# Patient Record
Sex: FEMALE | Race: ASIAN | NOT HISPANIC OR LATINO | Employment: FULL TIME | URBAN - METROPOLITAN AREA
[De-identification: names, ages, dates, MRNs, and addresses within clinical notes are randomized per-mention and may not be internally consistent; named-entity substitution may affect disease eponyms.]

---

## 2017-11-02 ENCOUNTER — ALLSCRIPTS OFFICE VISIT (OUTPATIENT)
Dept: OTHER | Facility: OTHER | Age: 57
End: 2017-11-02

## 2017-11-02 ENCOUNTER — LAB REQUISITION (OUTPATIENT)
Dept: LAB | Facility: HOSPITAL | Age: 57
End: 2017-11-02
Payer: COMMERCIAL

## 2017-11-02 DIAGNOSIS — Z11.51 ENCOUNTER FOR SCREENING FOR HUMAN PAPILLOMAVIRUS (HPV): ICD-10-CM

## 2017-11-02 DIAGNOSIS — Z12.4 ENCOUNTER FOR SCREENING FOR MALIGNANT NEOPLASM OF CERVIX: ICD-10-CM

## 2017-11-02 DIAGNOSIS — Z12.31 ENCOUNTER FOR SCREENING MAMMOGRAM FOR MALIGNANT NEOPLASM OF BREAST: ICD-10-CM

## 2017-11-02 PROCEDURE — 87624 HPV HI-RISK TYP POOLED RSLT: CPT | Performed by: OBSTETRICS & GYNECOLOGY

## 2017-11-02 PROCEDURE — G0145 SCR C/V CYTO,THINLAYER,RESCR: HCPCS | Performed by: OBSTETRICS & GYNECOLOGY

## 2017-11-03 ENCOUNTER — TRANSCRIBE ORDERS (OUTPATIENT)
Dept: ADMINISTRATIVE | Facility: HOSPITAL | Age: 57
End: 2017-11-03

## 2017-11-03 ENCOUNTER — APPOINTMENT (OUTPATIENT)
Dept: LAB | Facility: HOSPITAL | Age: 57
End: 2017-11-03
Payer: COMMERCIAL

## 2017-11-03 DIAGNOSIS — I51.9 MYXEDEMA HEART DISEASE: ICD-10-CM

## 2017-11-03 DIAGNOSIS — D64.9 ANEMIA, UNSPECIFIED TYPE: ICD-10-CM

## 2017-11-03 DIAGNOSIS — I10 ESSENTIAL HYPERTENSION, MALIGNANT: ICD-10-CM

## 2017-11-03 DIAGNOSIS — I51.9 MYXEDEMA HEART DISEASE: Primary | ICD-10-CM

## 2017-11-03 DIAGNOSIS — E03.9 MYXEDEMA HEART DISEASE: ICD-10-CM

## 2017-11-03 DIAGNOSIS — Z13.1 SCREENING FOR DIABETES MELLITUS: ICD-10-CM

## 2017-11-03 DIAGNOSIS — Z13.29 SCREENING FOR THYROID DISORDER: ICD-10-CM

## 2017-11-03 DIAGNOSIS — E03.9 MYXEDEMA HEART DISEASE: Primary | ICD-10-CM

## 2017-11-03 LAB
25(OH)D3 SERPL-MCNC: 24.9 NG/ML (ref 30–100)
ALBUMIN SERPL BCP-MCNC: 4.2 G/DL (ref 3.5–5)
ALP SERPL-CCNC: 68 U/L (ref 46–116)
ALT SERPL W P-5'-P-CCNC: 21 U/L (ref 12–78)
ANION GAP SERPL CALCULATED.3IONS-SCNC: 5 MMOL/L (ref 4–13)
AST SERPL W P-5'-P-CCNC: 21 U/L (ref 5–45)
BACTERIA UR QL AUTO: ABNORMAL /HPF
BASOPHILS # BLD AUTO: 0.03 THOUSANDS/ΜL (ref 0–0.1)
BASOPHILS NFR BLD AUTO: 1 % (ref 0–1)
BILIRUB SERPL-MCNC: 0.97 MG/DL (ref 0.2–1)
BILIRUB UR QL STRIP: ABNORMAL
BUN SERPL-MCNC: 17 MG/DL (ref 5–25)
CALCIUM SERPL-MCNC: 9.3 MG/DL (ref 8.3–10.1)
CHLORIDE SERPL-SCNC: 103 MMOL/L (ref 100–108)
CHOLEST SERPL-MCNC: 200 MG/DL (ref 50–200)
CLARITY UR: CLEAR
CO2 SERPL-SCNC: 32 MMOL/L (ref 21–32)
COLOR UR: YELLOW
CREAT SERPL-MCNC: 0.7 MG/DL (ref 0.6–1.3)
EOSINOPHIL # BLD AUTO: 0.12 THOUSAND/ΜL (ref 0–0.61)
EOSINOPHIL NFR BLD AUTO: 3 % (ref 0–6)
ERYTHROCYTE [DISTWIDTH] IN BLOOD BY AUTOMATED COUNT: 12.6 % (ref 11.6–15.1)
GFR SERPL CREATININE-BSD FRML MDRD: 96 ML/MIN/1.73SQ M
GLUCOSE P FAST SERPL-MCNC: 92 MG/DL (ref 65–99)
GLUCOSE UR STRIP-MCNC: NEGATIVE MG/DL
HCT VFR BLD AUTO: 39 % (ref 34.8–46.1)
HDLC SERPL-MCNC: 50 MG/DL (ref 40–60)
HGB BLD-MCNC: 13.5 G/DL (ref 11.5–15.4)
HGB UR QL STRIP.AUTO: ABNORMAL
KETONES UR STRIP-MCNC: ABNORMAL MG/DL
LDLC SERPL CALC-MCNC: 133 MG/DL (ref 0–100)
LEUKOCYTE ESTERASE UR QL STRIP: ABNORMAL
LYMPHOCYTES # BLD AUTO: 1.28 THOUSANDS/ΜL (ref 0.6–4.47)
LYMPHOCYTES NFR BLD AUTO: 27 % (ref 14–44)
MCH RBC QN AUTO: 31.5 PG (ref 26.8–34.3)
MCHC RBC AUTO-ENTMCNC: 34.6 G/DL (ref 31.4–37.4)
MCV RBC AUTO: 91 FL (ref 82–98)
MONOCYTES # BLD AUTO: 0.37 THOUSAND/ΜL (ref 0.17–1.22)
MONOCYTES NFR BLD AUTO: 8 % (ref 4–12)
NEUTROPHILS # BLD AUTO: 2.92 THOUSANDS/ΜL (ref 1.85–7.62)
NEUTS SEG NFR BLD AUTO: 61 % (ref 43–75)
NITRITE UR QL STRIP: NEGATIVE
NON-SQ EPI CELLS URNS QL MICRO: ABNORMAL /HPF
NRBC BLD AUTO-RTO: 0 /100 WBCS
PH UR STRIP.AUTO: 6 [PH] (ref 4.5–8)
PLATELET # BLD AUTO: 204 THOUSANDS/UL (ref 149–390)
PMV BLD AUTO: 9.5 FL (ref 8.9–12.7)
POTASSIUM SERPL-SCNC: 4.1 MMOL/L (ref 3.5–5.3)
PROT SERPL-MCNC: 8 G/DL (ref 6.4–8.2)
PROT UR STRIP-MCNC: NEGATIVE MG/DL
RBC # BLD AUTO: 4.29 MILLION/UL (ref 3.81–5.12)
RBC #/AREA URNS AUTO: ABNORMAL /HPF
SODIUM SERPL-SCNC: 140 MMOL/L (ref 136–145)
SP GR UR STRIP.AUTO: >=1.03 (ref 1–1.03)
TRIGL SERPL-MCNC: 84 MG/DL
TSH SERPL DL<=0.05 MIU/L-ACNC: 2.55 UIU/ML (ref 0.36–3.74)
UROBILINOGEN UR QL STRIP.AUTO: 0.2 E.U./DL
WBC # BLD AUTO: 4.72 THOUSAND/UL (ref 4.31–10.16)
WBC #/AREA URNS AUTO: ABNORMAL /HPF

## 2017-11-03 PROCEDURE — 85025 COMPLETE CBC W/AUTO DIFF WBC: CPT

## 2017-11-03 PROCEDURE — 80053 COMPREHEN METABOLIC PANEL: CPT

## 2017-11-03 PROCEDURE — 81001 URINALYSIS AUTO W/SCOPE: CPT | Performed by: PHYSICIAN ASSISTANT

## 2017-11-03 PROCEDURE — 84443 ASSAY THYROID STIM HORMONE: CPT

## 2017-11-03 PROCEDURE — 82306 VITAMIN D 25 HYDROXY: CPT

## 2017-11-03 PROCEDURE — 36415 COLL VENOUS BLD VENIPUNCTURE: CPT

## 2017-11-03 PROCEDURE — 80061 LIPID PANEL: CPT

## 2017-11-04 NOTE — PROGRESS NOTES
Assessment  1  Visit for routine gyn exam (V72 31) (Z01 419)   2  Visit for screening mammogram (V76 12) (Z12 31)   3  Pap test, as part of routine gynecological examination (V76 2) (Z01 419)   4  Fibroids (218 9) (D25 9)    Plan   Cervical cancer screening, Screening for HPV (human papillomavirus)    · (1) THIN PREP PAP WITH IMAGING; Status: In Progress - Specimen/Data Collected;    Done: [id number]   Perform:[facility] In Office Collection; Order Comments:ROUTINE PAP WITH HPV; IEK:[id number]; Ordered; For:Cervical cancer screening, Screening for HPV (human papillomavirus); Ordered By:Devika Edward;  Maturation index required? : No  : POSTMENOPAUSAL  HPV? : Regardless of Interpretation    * MAMMO SCREENING BILATERAL W CAD; Status:Hold For - Scheduling; Requested XUZ:[id number];   Perform:[facility]iology; AJD:[id number]; Ordered; For:Visit for screening mammogram; Ordered By:Devika Edward;      Discussion/Summary  HPV and Pap Co-testing Done Today Breast cancer screening: the risks and benefits of breast cancer screening were discussed, self breast exam technique was taught, monthly self breast exam was advised, mammogram is current and mammogram has been ordered  Colorectal cancer screening: the risks and benefits of colorectal cancer screening were discussed  Advice and education were given regarding aerobic exercise and weight bearing exercise  Fibroids-stable on exam, will recheck ultrasound  reviewed with her and new one ordered as she is due later this month  and HPV done today  pressure was 140/82, a wrote this down for her so she can bring it to her appointment  is considering scheduling a colonoscopy  The patient has the current Goals: See discussion  The patent has the current Barriers: None  Chief Complaint  1   Visit For: Preventive General Multisystem Exam    History of Present Illness  HPI: Patient here for yearly,she has a history of fibroids, they are asymptomatic ultrasound showed that they are slightly smaller  No gyn complaintshas been watching her blood pressure, she sees Dr ochoa for this  for Pap  Dr Barbara Miramontes 11-20 for BP check   GYN HM, Adult Female Tommie Reid: The patient is being seen for a health maintenance evaluation  The last health maintenance visit was 1 year(s) ago  General Health: The patient's health since the last visit is described as good  Lifestyle:  She does not have any weight concerns  -- She exercises regularly  Reproductive health: the patient is postmenopausal    Screening:      Review of Systems    Constitutional: No fever, no chills, feels well, no tiredness, no recent weight gain or loss  ENT: no ear ache, no loss of hearing, no nosebleeds or nasal discharge, no sore throat or hoarseness  Cardiovascular: no complaints of slow or fast heart rate, no chest pain, no palpitations, no leg claudication or lower extremity edema  Respiratory: no complaints of shortness of breath, no wheezing, no dyspnea on exertion, no orthopnea or PND  Breasts: no complaints of breast pain, breast lump or nipple discharge  Gastrointestinal: no complaints of abdominal pain, no constipation, no nausea or diarrhea, no vomiting, no bloody stools  Genitourinary: no complaints of dysuria, no incontinence, no pelvic pain, no dysmenorrhea, no vaginal discharge or abnormal vaginal bleeding  Musculoskeletal: no complaints of arthralgia, no myalgia, no joint swelling or stiffness, no limb pain or swelling  Integumentary: no complaints of skin rash or lesion, no itching or dry skin, no skin wounds  Neurological: no complaints of headache, no confusion, no numbness or tingling, no dizziness or fainting  Active Problems  1  Fibroids (218 9) (D25 9)   2  History of self breast exam   3  Pap test, as part of routine gynecological examination (V76 2) (Z01 419)   4  Visit for routine gyn exam (V72 31) (Z01 419)   5   Visit for screening mammogram (V76 12) (Z12 31)    Past Medical History   · History of  5 (V22 2) (Z33 1)   · History of miscarriage (V13 29) (Z87 59)    The active problems and past medical history were reviewed and updated today  Surgical History   · Denied: History Of Prior Surgery   · History of Oophorectomy   · History of Tubal Ligation    The surgical history was reviewed and updated today  Family History  Mother    · Family history of malignant neoplasm (V16 9) (Z80 9)  Father    · Family history of cerebrovascular accident (V17 1) (Z82 3)  Brother    · Family history of malignant neoplasm (V16 9) (Z80 9)    The family history was reviewed and updated today  Social History   · Always uses seat belt   · Caffeine use (V49 89) (F15 90)   · [employer]   · Denied: History of Drug use   · Four children   ·    · Never a smoker   · No alcohol use  The social history was reviewed and updated today  Current Meds   1  Calcium TABS; Therapy: (Recorded:44Dvx5633) to Recorded    Allergies  1  No Known Drug Allergies    Vitals   Recorded: 16HTW0038 22:96CM   Systolic 462, LUE, Sitting   Diastolic 82, LUE, Sitting   Height 5 ft 2 in   Weight 145 lb 4 oz   BMI Calculated 26 57   BSA Calculated 1 67   LMP POSTMENOPAUSAL     Physical Exam    Constitutional   General appearance: No acute distress, well appearing and well nourished  Neck   Thyroid: Normal, no thyromegaly  Pulmonary   Auscultation of lungs: Clear to auscultation  Cardiovascular   Auscultation of heart: Normal rate and rhythm, normal S1 and S2, no murmurs  Genitourinary   External genitalia: Normal and no lesions appreciated  Vagina: Normal, no lesions or dryness appreciated  -- Very atrophic  Urethra: Normal     Urethral meatus: Normal     Bladder: Normal, soft, non-tender and no prolapse or masses appreciated  Cervix: Normal, no palpable masses  Uterus: Abnormal  -- Fourteen week size, irregular, large posterior fibroid  Adnexa/parametria: Normal, non-tender and no fullness or masses appreciated  Anus, perineum, and rectum: Normal sphincter tone, no masses, and no prolapse  Chest   Breasts: Normal and no dimpling or skin changes noted  Abdomen   Abdomen: Normal, non-tender, and no organomegaly noted  Liver and spleen: No hepatomegaly or splenomegaly  Examination for hernias: No hernias appreciated  Psychiatric   Orientation to person, place, and time: Normal     Mood and affect: Normal        Future Appointments    Date/Time Provider Specialty Site   11/07/2017 07:30 AM [facility], Ultrasound [facility]     Signatures   Electronically signed by :  Mandeep Lisa DO; Nov  3 2017  6:18AM EST                       (Author)

## 2017-11-08 ENCOUNTER — HOSPITAL ENCOUNTER (OUTPATIENT)
Dept: MAMMOGRAPHY | Facility: HOSPITAL | Age: 57
Discharge: HOME | End: 2017-11-08
Attending: OBSTETRICS & GYNECOLOGY
Payer: COMMERCIAL

## 2017-11-08 DIAGNOSIS — Z12.31 ENCOUNTER FOR SCREENING MAMMOGRAM FOR MALIGNANT NEOPLASM OF BREAST: ICD-10-CM

## 2017-11-08 LAB — HPV RRNA GENITAL QL NAA+PROBE: NORMAL

## 2017-11-08 PROCEDURE — G0202 SCR MAMMO BI INCL CAD: HCPCS

## 2017-11-08 NOTE — PROCEDURES
Plan  Visit for screening mammogram    · * MAMMO SCREENING BILATERAL W CAD; Status:Active; Requested CUN:[id number];    Perform:[facility] Radiology; EGH:[id number]; Last Updated By:Devika Davis; 11/2/2017 7:34:55 PM;Ordered; For:Visit for screening mammogram; Ordered By:Devika Edward;    Active Problems  1  Cervical cancer screening (V76 2) (Z12 4)   2  Fibroids (218 9) (D25 9)   3  History of self breast exam   4  Pap test, as part of routine gynecological examination (V76 2) (Z01 419)   5  Screening for HPV (human papillomavirus) (V73 81) (Z11 51)   6  Visit for routine gyn exam (V72 31) (Z01 419)   7  Visit for screening mammogram (V76 12) (Z12 31)    Current Meds  1  Calcium TABS; Therapy: (Recorded:37Wnq5660) to Recorded    Allergies  1  No Known Drug Allergies    Results/Data  Transvaginal Ultrasound:   Procedure: Transvaginal Pelvic Sonogram -- The study was done today in the office  -- Transabdominal Pelvic Sonogram    Indication: Uterine Leiomyoma  Procedure Note:   Patient placed in dorsal lithotomy position with legs in stirrups  Ultrasound probe was covered wtih a condom, lubricated with water soluable gel  The ultrasound study was then performed  -- Additional sagittal and transverse images were obtained transabdominally to better assess the overall uterine and fibroid size  Findings:   Uterus:  12 9 x 7 0 x 8 3cm  Ovaries:  RT 2 4 x 1 6 x 1 9cm LT 2 5 x 1 4 x 1 8cm  Endometrium:  0 8mm  Impression:  Enlarged anteverted uterus demonstrates multiple fibroids  Largest are left subserosal posterior 7 6cm (previously 8 1cm), right anterior subserosal 2 2cm (previously 2 9cm), right lateral 4 6cm (stable)  Bilateral ovaries appear within normal limits  No free fluid  Patient Status: the patient tolerated the procedure well  Complications:  there were no complications        Signatures   Electronically signed by : Delmy Pratt, ; Nov 7 2017  7:34AM EST                       (Author)    Electronically signed by :  Laura Erwin DO; Nov 7 2017 11:33AM EST                       (Author)

## 2017-11-09 LAB
LAB AP GYN PRIMARY INTERPRETATION: NORMAL
Lab: NORMAL

## 2017-11-10 ENCOUNTER — GENERIC CONVERSION - ENCOUNTER (OUTPATIENT)
Dept: OTHER | Facility: OTHER | Age: 57
End: 2017-11-10

## 2017-11-13 ENCOUNTER — GENERIC CONVERSION - ENCOUNTER (OUTPATIENT)
Dept: OTHER | Facility: OTHER | Age: 57
End: 2017-11-13

## 2018-01-09 NOTE — MISCELLANEOUS
Message   Recorded as Task   Date: 10/07/2016 05:42 PM, Created By: Yeyo Saleh   Task Name: Review Note   Assigned To: [facility]   Regarding Patient: Lele eB, Status: Active   Comment:    Yeyo Saleh - 07 Oct 2016 5:42 PM     TASK CREATED  US shows fibroids are all slightly smaller and ovaries are normal   Pt informed  Active Problems    1  Fibroids (218 9) (D25 9)   2  History of self breast exam   3  Pap test, as part of routine gynecological examination (V76 2) (Z01 419)   4  Visit for routine gyn exam (V72 31) (Z01 419)   5  Visit for screening mammogram (V76 12) (Z12 31)    Current Meds   1  Calcium TABS; Therapy: (Recorded:49Vaz4848) to Recorded   2  iFerex 150 150 MG Oral Capsule; Therapy: 43GUQ1781 to Recorded    Allergies    1   No Known Drug Allergies    Signatures   Electronically signed by : Armin Ruano, ; Oct 10 2016  3:15PM EST                       (Author)

## 2018-01-11 NOTE — MISCELLANEOUS
Message   Recorded as Task   Date: 11/07/2017 11:33 AM, Created By: Clint Aleman   Task Name: Review Note   Assigned To: Sukh Chairez   Regarding Patient: Kris Carvajal, Status: In Progress   Comment:    Clint Aleman - 07 Nov 2017 11:33 AM     TASK CREATED  US shows all fibroids are stable or slightly smaller, normal ovaries   [facility] [place] - 07 Nov 2017 12:46 PM     TASK EDITED  lm for pt   [facility] - 07 Nov 2017 12:46 PM     TASK IN PROGRESS   Jovita Norwood - 13 Nov 2017 12:02 PM     TASK EDITED  pt informed        Active Problems    1  Cervical cancer screening (V76 2) (Z12 4)   2  Fibroids (218 9) (D25 9)   3  History of self breast exam   4  Pap test, as part of routine gynecological examination (V76 2) (Z01 419)   5  Screening for HPV (human papillomavirus) (V73 81) (Z11 51)   6  Visit for routine gyn exam (V72 31) (Z01 419)   7  Visit for screening mammogram (V76 12) (Z12 31)    Current Meds   1  Calcium TABS; Therapy: (Recorded:06Cwo0181) to Recorded    Allergies    1   No Known Drug Allergies    Signatures   Electronically signed by : Mary Vasquez, ; Nov 13 2017 12:02PM EST                       (Author)

## 2018-01-13 VITALS
BODY MASS INDEX: 26.73 KG/M2 | DIASTOLIC BLOOD PRESSURE: 82 MMHG | WEIGHT: 145.25 LBS | SYSTOLIC BLOOD PRESSURE: 140 MMHG | HEIGHT: 62 IN

## 2018-01-13 NOTE — MISCELLANEOUS
Message   Recorded as Task   Date: 11/09/2017 08:29 PM, Created By: Jocelyne Carcamo   Task Name: Go to Result   Assigned To: Sherif Palmer   Regarding Patient: Radha Esquivel, Status: Active   Comment:    Jocelyne Carcamo - 09 Nov 2017 8:29 PM     TASK CREATED  nml pap, neg hpv   EnmaJovita - 10 Nov 2017 9:49 AM     TASK EDITED  normal card mailed        Active Problems    1  Cervical cancer screening (V76 2) (Z12 4)   2  Fibroids (218 9) (D25 9)   3  History of self breast exam   4  Pap test, as part of routine gynecological examination (V76 2) (Z01 419)   5  Screening for HPV (human papillomavirus) (V73 81) (Z11 51)   6  Visit for routine gyn exam (V72 31) (Z01 419)   7  Visit for screening mammogram (V76 12) (Z12 31)    Current Meds   1  Calcium TABS; Therapy: (Recorded:34Spz5674) to Recorded    Allergies    1   No Known Drug Allergies    Signatures   Electronically signed by : Colonel Real, ; Nov 10 2017  9:49AM EST                       (Author)

## 2018-07-11 ENCOUNTER — TRANSCRIBE ORDERS (OUTPATIENT)
Dept: ADMINISTRATIVE | Facility: HOSPITAL | Age: 58
End: 2018-07-11

## 2018-07-11 ENCOUNTER — APPOINTMENT (OUTPATIENT)
Dept: LAB | Facility: HOSPITAL | Age: 58
End: 2018-07-11
Payer: COMMERCIAL

## 2018-07-11 DIAGNOSIS — E78.5 HYPERLIPIDEMIA, UNSPECIFIED HYPERLIPIDEMIA TYPE: Primary | ICD-10-CM

## 2018-07-11 DIAGNOSIS — E78.5 HYPERLIPIDEMIA, UNSPECIFIED HYPERLIPIDEMIA TYPE: ICD-10-CM

## 2018-07-11 LAB
CHOLEST SERPL-MCNC: 208 MG/DL (ref 50–200)
HDLC SERPL-MCNC: 50 MG/DL (ref 40–60)
LDLC SERPL CALC-MCNC: 137 MG/DL (ref 0–100)
NONHDLC SERPL-MCNC: 158 MG/DL
TRIGL SERPL-MCNC: 104 MG/DL

## 2018-07-11 PROCEDURE — 36415 COLL VENOUS BLD VENIPUNCTURE: CPT

## 2018-07-11 PROCEDURE — 80061 LIPID PANEL: CPT

## 2019-01-11 ENCOUNTER — ANNUAL EXAM (OUTPATIENT)
Dept: OBGYN CLINIC | Facility: CLINIC | Age: 59
End: 2019-01-11
Payer: COMMERCIAL

## 2019-01-11 VITALS
WEIGHT: 149.8 LBS | DIASTOLIC BLOOD PRESSURE: 78 MMHG | SYSTOLIC BLOOD PRESSURE: 128 MMHG | HEIGHT: 62 IN | BODY MASS INDEX: 27.57 KG/M2

## 2019-01-11 DIAGNOSIS — Z01.419 ENCOUNTER FOR ANNUAL ROUTINE GYNECOLOGICAL EXAMINATION: Primary | ICD-10-CM

## 2019-01-11 DIAGNOSIS — Z12.31 ENCOUNTER FOR SCREENING MAMMOGRAM FOR BREAST CANCER: ICD-10-CM

## 2019-01-11 PROCEDURE — 99396 PREV VISIT EST AGE 40-64: CPT | Performed by: OBSTETRICS & GYNECOLOGY

## 2019-01-11 NOTE — PROGRESS NOTES
Assessment/Plan:    Pap is up to date    Mammogram reviewed with her and prescription given so that she can schedule this  Discussed self-breast exams    Fibroids-these are asymptomatic and are smaller on exam as well as her last ultrasound which was a year ago  Colon cancer screening-she has a new family doctor, she had to change because of insurance and he gave her a fecal occult blood test to do at home since she has not yet had colonoscopy  We discussed regular exercise, she is very active as she works in [employer] helps  No problem-specific Assessment & Plan notes found for this encounter  Diagnoses and all orders for this visit:    Encounter for annual routine gynecological examination    Encounter for screening mammogram for breast cancer  -     Mammo screening bilateral w cad; Future          Subjective:      Patient ID: Yemi Harrison is a 60 y o  female  Patient here for yearly  She has no gyn complaints  She has a history of a large fibroid but that has been asymptomatic  At her last 2 ultrasounds, it was smaller  She is due for a mammogram, she had scattered fibroglandular densities with a normal risk  She had a normal Pap and negative HPV in 2017  The following portions of the patient's history were reviewed and updated as appropriate: allergies, current medications, past family history, past medical history, past social history, past surgical history and problem list     Review of Systems   Constitutional: Negative  HENT: Negative  Eyes: Negative  Respiratory: Negative  Cardiovascular: Negative  Gastrointestinal: Negative  Endocrine: Negative  Genitourinary: Negative  Musculoskeletal: Negative  Skin: Negative  Allergic/Immunologic: Negative  Neurological: Negative  Hematological: Negative  Psychiatric/Behavioral: Negative            Objective:      /78 (BP Location: Left arm, Patient Position: Sitting, Cuff Size: Large)   Ht 5' 2" (1 575 m)   Wt 67 9 kg (149 lb 12 8 oz)   BMI 27 40 kg/m²          Physical Exam   Constitutional: She appears well-developed  Neck: No tracheal deviation present  No thyromegaly present  Cardiovascular: Normal rate and regular rhythm  Pulmonary/Chest: Effort normal and breath sounds normal  Right breast exhibits no inverted nipple, no mass, no nipple discharge, no skin change and no tenderness  Left breast exhibits no inverted nipple, no mass, no nipple discharge, no skin change and no tenderness  Breasts are symmetrical    Examined seated and supine   Abdominal: Soft  She exhibits no distension and no mass  There is no tenderness  Genitourinary: Rectum normal, vagina normal and uterus normal  No labial fusion  There is no rash, tenderness, lesion or injury on the right labia  There is no rash, tenderness, lesion or injury on the left labia  Cervix exhibits no motion tenderness, no discharge and no friability  Right adnexum displays no mass, no tenderness and no fullness  Left adnexum displays no mass, no tenderness and no fullness  Genitourinary Comments: Patient's uterus seems smaller on exam, the right-sided fibroid is palpated but the large posterior 1 is more difficult to palpate  Vitals reviewed

## 2019-01-18 ENCOUNTER — HOSPITAL ENCOUNTER (OUTPATIENT)
Dept: MAMMOGRAPHY | Facility: HOSPITAL | Age: 59
Discharge: HOME | End: 2019-01-18
Attending: OBSTETRICS & GYNECOLOGY
Payer: COMMERCIAL

## 2019-01-18 VITALS — BODY MASS INDEX: 27.42 KG/M2 | WEIGHT: 149 LBS | HEIGHT: 62 IN

## 2019-01-18 DIAGNOSIS — Z12.31 ENCOUNTER FOR SCREENING MAMMOGRAM FOR BREAST CANCER: ICD-10-CM

## 2019-01-18 PROCEDURE — 77067 SCR MAMMO BI INCL CAD: CPT

## 2020-03-19 ENCOUNTER — ANNUAL EXAM (OUTPATIENT)
Dept: OBGYN CLINIC | Facility: CLINIC | Age: 60
End: 2020-03-19
Payer: COMMERCIAL

## 2020-03-19 VITALS — BODY MASS INDEX: 28.42 KG/M2 | DIASTOLIC BLOOD PRESSURE: 82 MMHG | SYSTOLIC BLOOD PRESSURE: 130 MMHG | WEIGHT: 155.4 LBS

## 2020-03-19 DIAGNOSIS — Z01.419 ENCOUNTER FOR ANNUAL ROUTINE GYNECOLOGICAL EXAMINATION: Primary | ICD-10-CM

## 2020-03-19 DIAGNOSIS — Z12.31 ENCOUNTER FOR SCREENING MAMMOGRAM FOR BREAST CANCER: ICD-10-CM

## 2020-03-19 PROCEDURE — 99396 PREV VISIT EST AGE 40-64: CPT | Performed by: OBSTETRICS & GYNECOLOGY

## 2020-03-19 NOTE — PROGRESS NOTES
Assessment/Plan:    pap is up to date    mammogram reviewed with her including breast density  RX given so she can schedule this    Discussed self breast exams    colon cancer screening - she does a yearly hemoccult through her family dr   Her next appt with him is April 7th    discussed preventive care, regular exercise and a healthy diet      No problem-specific Assessment & Plan notes found for this encounter  Diagnoses and all orders for this visit:    Encounter for annual routine gynecological examination    Encounter for screening mammogram for breast cancer  -     Mammo screening bilateral w cad; Future          Subjective:      Patient ID: Mark Nino is a 60 y o  female  Patient here for yearly  She has no gyn complaints  She has asymptomatic fibroids for many years that had been getting smaller and are stable on exam     Normal mammogram in January 2019 with scattered fibroglandular densities and an average risk  Normal Pap and negative HPV in November 2017  The following portions of the patient's history were reviewed and updated as appropriate: allergies, current medications, past family history, past medical history, past social history, past surgical history and problem list     Review of Systems   Constitutional: Negative  Gastrointestinal: Negative  Genitourinary: Negative  Objective:      /82 (BP Location: Left arm, Patient Position: Sitting, Cuff Size: Standard)   Wt 70 5 kg (155 lb 6 4 oz)   BMI 28 42 kg/m²          Physical Exam   Constitutional: She appears well-developed  Neck: No tracheal deviation present  No thyromegaly present  Cardiovascular: Normal rate and regular rhythm  Pulmonary/Chest: Effort normal and breath sounds normal  Right breast exhibits no inverted nipple, no mass, no nipple discharge, no skin change and no tenderness  Left breast exhibits no inverted nipple, no mass, no nipple discharge, no skin change and no tenderness  Breasts are symmetrical    Examined seated and supine   Abdominal: Soft  She exhibits no distension and no mass  There is no tenderness  Genitourinary: Rectum normal, vagina normal and uterus normal  No labial fusion  There is no rash, tenderness, lesion or injury on the right labia  There is no rash, tenderness, lesion or injury on the left labia  Cervix exhibits no motion tenderness, no discharge and no friability  Right adnexum displays no mass, no tenderness and no fullness  Left adnexum displays no mass, no tenderness and no fullness  Genitourinary Comments: Right fibroid palpated, smaller   Vitals reviewed

## 2022-01-06 ENCOUNTER — ANNUAL EXAM (OUTPATIENT)
Dept: OBGYN CLINIC | Facility: CLINIC | Age: 62
End: 2022-01-06
Payer: COMMERCIAL

## 2022-01-06 VITALS
BODY MASS INDEX: 27.52 KG/M2 | SYSTOLIC BLOOD PRESSURE: 136 MMHG | HEIGHT: 64 IN | WEIGHT: 161.2 LBS | DIASTOLIC BLOOD PRESSURE: 76 MMHG

## 2022-01-06 DIAGNOSIS — Z01.419 ENCOUNTER FOR ANNUAL ROUTINE GYNECOLOGICAL EXAMINATION: ICD-10-CM

## 2022-01-06 DIAGNOSIS — Z12.31 ENCOUNTER FOR SCREENING MAMMOGRAM FOR BREAST CANCER: ICD-10-CM

## 2022-01-06 DIAGNOSIS — Z12.4 CERVICAL CANCER SCREENING: Primary | ICD-10-CM

## 2022-01-06 DIAGNOSIS — Z11.51 SCREENING FOR HPV (HUMAN PAPILLOMAVIRUS): ICD-10-CM

## 2022-01-06 PROCEDURE — G0476 HPV COMBO ASSAY CA SCREEN: HCPCS | Performed by: OBSTETRICS & GYNECOLOGY

## 2022-01-06 PROCEDURE — G0145 SCR C/V CYTO,THINLAYER,RESCR: HCPCS | Performed by: OBSTETRICS & GYNECOLOGY

## 2022-01-06 PROCEDURE — S0612 ANNUAL GYNECOLOGICAL EXAMINA: HCPCS | Performed by: OBSTETRICS & GYNECOLOGY

## 2022-01-06 RX ORDER — MULTIVIT WITH MINERALS/LUTEIN
1000 TABLET ORAL DAILY
COMMUNITY

## 2022-01-06 NOTE — PROGRESS NOTES
Assessment/Plan:      Pap and HPV done today    mammogram reviewed with her including breast density  RX given for August     Discussed self breast exams    colon cancer screening - she does a hemoccult test every year through her family       fibroid is stable, asymptomatic    discussed preventive care, regular exercise and a healthy diet      No problem-specific Assessment & Plan notes found for this encounter  Diagnoses and all orders for this visit:    Cervical cancer screening  -     Liquid-based pap, screening    Encounter for annual routine gynecological examination  -     Liquid-based pap, screening    Encounter for screening mammogram for breast cancer  -     Mammo screening bilateral w 3d & cad; Future    Screening for HPV (human papillomavirus)  -     Liquid-based pap, screening    Other orders  -     Ascorbic Acid (vitamin C) 1000 MG tablet; Take 1,000 mg by mouth daily          Subjective:      Patient ID: Ingris Lowry is a 57 y o  female  Patient here for yearly  She has no complaints  She and her  had [employer] a couple weeks ago  She has a stable right-sided fibroid  It is asymptomatic  Normal pap, negative HPV in 2017    Normal 3D mammogram in August with scattered fibroglandular densities  The following portions of the patient's history were reviewed and updated as appropriate: allergies, current medications, past family history, past medical history, past social history, past surgical history and problem list     Review of Systems   Constitutional: Negative  Gastrointestinal: Negative  Genitourinary: Negative  Objective:      /76 (BP Location: Left arm, Patient Position: Sitting, Cuff Size: Standard)   Ht 5' 4" (1 626 m)   Wt 73 1 kg (161 lb 3 2 oz)   BMI 27 67 kg/m²          Physical Exam  Vitals reviewed  Constitutional:       Appearance: She is well-developed  Neck:      Thyroid: No thyromegaly  Trachea: No tracheal deviation  Cardiovascular:      Rate and Rhythm: Normal rate and regular rhythm  Pulmonary:      Effort: Pulmonary effort is normal       Breath sounds: Normal breath sounds  Chest:   Breasts: Breasts are symmetrical       Right: No inverted nipple, mass, nipple discharge, skin change or tenderness  Left: No inverted nipple, mass, nipple discharge, skin change or tenderness  Abdominal:      General: There is no distension  Palpations: Abdomen is soft  There is no mass  Tenderness: There is no abdominal tenderness  Genitourinary:     Labia:         Right: No rash, tenderness, lesion or injury  Left: No rash, tenderness, lesion or injury  Vagina: Normal       Cervix: No cervical motion tenderness, discharge or friability  Adnexa:         Right: No mass, tenderness or fullness  Left: No mass, tenderness or fullness          Rectum: Normal       Comments: Right fibroid

## 2022-01-11 LAB
HPV HR 12 DNA CVX QL NAA+PROBE: NEGATIVE
HPV16 DNA CVX QL NAA+PROBE: NEGATIVE
HPV18 DNA CVX QL NAA+PROBE: NEGATIVE

## 2022-01-14 LAB
LAB AP GYN PRIMARY INTERPRETATION: NORMAL
Lab: NORMAL

## 2024-05-20 ENCOUNTER — TELEPHONE (OUTPATIENT)
Dept: GYNECOLOGY | Facility: CLINIC | Age: 64
End: 2024-05-20

## 2024-05-20 NOTE — TELEPHONE ENCOUNTER
Called patient to get insurance information.   Patient stated she, will come into office to give her insurance card and ID. She stated she wanted to have her mammo printed also but patient is unable to get mammo until 6/12/24 that was her last mammo.

## 2024-05-30 ENCOUNTER — ANNUAL EXAM (OUTPATIENT)
Dept: GYNECOLOGY | Facility: CLINIC | Age: 64
End: 2024-05-30
Payer: COMMERCIAL

## 2024-05-30 VITALS
SYSTOLIC BLOOD PRESSURE: 158 MMHG | HEIGHT: 64 IN | BODY MASS INDEX: 28.38 KG/M2 | WEIGHT: 166.2 LBS | DIASTOLIC BLOOD PRESSURE: 80 MMHG

## 2024-05-30 DIAGNOSIS — Z78.0 ASYMPTOMATIC MENOPAUSE: ICD-10-CM

## 2024-05-30 DIAGNOSIS — Z12.31 ENCOUNTER FOR SCREENING MAMMOGRAM FOR BREAST CANCER: ICD-10-CM

## 2024-05-30 DIAGNOSIS — Z01.419 ENCOUNTER FOR ANNUAL ROUTINE GYNECOLOGICAL EXAMINATION: Primary | ICD-10-CM

## 2024-05-30 PROCEDURE — S0612 ANNUAL GYNECOLOGICAL EXAMINA: HCPCS | Performed by: OBSTETRICS & GYNECOLOGY

## 2024-05-30 RX ORDER — LISINOPRIL 10 MG/1
10 TABLET ORAL DAILY
COMMUNITY
Start: 2024-04-30 | End: 2025-04-30

## 2024-05-30 NOTE — PROGRESS NOTES
Ambulatory Visit  Name: Lisa Childress      : 1960      MRN: [id number]  Encounter Provider: Devika Edward DO  Encounter Date: 2024   Encounter department: [facility] [place]    Assessment & Plan     pap is up to date - reviewed guidelines    mammogram reviewed with her including breast density.  RX given for next month    Discussed self breast exams    colon cancer screening-colonoscopy in 2023, she states Dr Norman was to repeat it this year.    Baseline DEXA ordered for after her birthday    Fibroids are stable on exam.  She is asymptomatic.    Repeat BP was the same.  She has an appointment with her family doctor on [address] 10 and she will discuss the cough and her BP with him.    I also recommended that if she continues to have right-sided discomfort after eating, this should be discussed with her family doctor also.    discussed preventive care, regular exercise and a healthy diet, portion control.  She would like to lose some of the weight she has gained.      History of Present Illness     Lisa Childress is a 63 y.o. female who presents for yearly.  She was just started on lisinopril for BP.  She has noted a small cough. She feels her BP is high today.   She retired in December as her place of employment closed.  She is stay ing active but has noticed that she gained weight.  She is up 16# from last year.  She occasionally has ruq discomfort after eating.    She has fibroids that have been asymptomatic.    Normal 3D mammogram last  with scattered fibroglandular densities.    Review of Systems   Constitutional: Negative.    Respiratory:  Positive for cough.    Gastrointestinal: Negative.         Ruq discomfort after eating   Genitourinary: Negative.        Objective     There were no vitals taken for this visit.    Physical Exam  Vitals and nursing note reviewed. Exam conducted with a chaperone present.   Constitutional:       General: She is not in acute distress.     Appearance:  She is well-developed.   HENT:      Head: Normocephalic and atraumatic.   Eyes:      Conjunctiva/sclera: Conjunctivae normal.   Neck:      Thyroid: No thyromegaly.   Cardiovascular:      Rate and Rhythm: Normal rate and regular rhythm.      Heart sounds: No murmur heard.  Pulmonary:      Effort: Pulmonary effort is normal. No respiratory distress.      Breath sounds: Normal breath sounds.   Chest:   Breasts:     Right: Normal.      Left: Normal.      Comments: Examined seated and supine  Abdominal:      Palpations: Abdomen is soft.      Tenderness: There is no abdominal tenderness.   Genitourinary:     Vagina: Normal.      Cervix: Normal.      Uterus: Normal.       Adnexa: Right adnexa normal and left adnexa normal.      Rectum: Normal.      Comments: Top normal size uterus, right sided fibroid palpated  Musculoskeletal:         General: No swelling.      Cervical back: Neck supple.   Skin:     General: Skin is warm and dry.      Capillary Refill: Capillary refill takes less than 2 seconds.   Neurological:      Mental Status: She is alert.   Psychiatric:         Mood and Affect: Mood normal.       Administrative Statements

## 2024-06-19 ENCOUNTER — VBI (OUTPATIENT)
Dept: ADMINISTRATIVE | Facility: OTHER | Age: 64
End: 2024-06-19

## 2025-06-19 ENCOUNTER — ANNUAL EXAM (OUTPATIENT)
Dept: GYNECOLOGY | Facility: CLINIC | Age: 65
End: 2025-06-19
Payer: COMMERCIAL

## 2025-06-19 VITALS — SYSTOLIC BLOOD PRESSURE: 134 MMHG | BODY MASS INDEX: 29.18 KG/M2 | WEIGHT: 170 LBS | DIASTOLIC BLOOD PRESSURE: 80 MMHG

## 2025-06-19 DIAGNOSIS — Z01.419 ENCOUNTER FOR ANNUAL ROUTINE GYNECOLOGICAL EXAMINATION: Primary | ICD-10-CM

## 2025-06-19 DIAGNOSIS — Z12.31 ENCOUNTER FOR SCREENING MAMMOGRAM FOR BREAST CANCER: ICD-10-CM

## 2025-06-19 PROCEDURE — G0101 CA SCREEN;PELVIC/BREAST EXAM: HCPCS | Performed by: OBSTETRICS & GYNECOLOGY

## 2025-06-19 RX ORDER — LOSARTAN POTASSIUM 100 MG/1
100 TABLET ORAL DAILY
COMMUNITY

## 2025-06-19 RX ORDER — AMLODIPINE BESYLATE 10 MG/1
10 TABLET ORAL DAILY
COMMUNITY
Start: 2025-05-07

## 2025-06-19 NOTE — PROGRESS NOTES
Name: Lisa Childress      : 1960      MRN: [id number]  Encounter Provider: Devika Edward DO  Encounter Date: 2025   Encounter department: [facility]  :  Assessment & Plan  Encounter for annual routine gynecological examination         Encounter for screening mammogram for breast cancer    Orders:    Mammo screening bilateral w 3d and cad; Future      She does not require a Pap, reviewed guidelines    mammogram reviewed with her including breast density.  RX given and she has this scheduled already  Discussed self breast exams    colon cancer screening -  colonoscopy in December, recall in 3 years    discussed preventive care, regular exercise and a healthy diet    History of Present Illness   HPI  Lisa Childress is a 64 y.o. female who presents for yearly.    Normal 3D mammogram last  with scattered fibroglandular densities and average risk.  DEXA in January showed osteopenia in the lumbar spine and femoral neck.  She did not meet treatment criteria.    Normal Pap, negative HPV in 2022    History of fibroids.  At her last ultrasound in , the largest had become smaller.  They have been asymptomatic for many years.      Review of Systems   Constitutional: Negative.    Gastrointestinal: Negative.    Genitourinary: Negative.           Objective   There were no vitals taken for this visit.     Physical Exam  Vitals and nursing note reviewed. Exam conducted with a chaperone present.   Constitutional:       Appearance: She is well-developed.   HENT:      Head: Normocephalic and atraumatic.   Neck:      Thyroid: No thyromegaly.     Cardiovascular:      Rate and Rhythm: Normal rate and regular rhythm.   Pulmonary:      Effort: Pulmonary effort is normal.      Breath sounds: Normal breath sounds.   Chest:   Breasts:     Right: Normal.      Left: Normal.      Comments: Examined seated and supine  Abdominal:      Palpations: Abdomen is soft.   Genitourinary:     General: Normal  vulva.      Vagina: Normal.      Cervix: Normal.      Uterus: Normal.       Adnexa: Right adnexa normal and left adnexa normal.      Rectum: Normal.      Comments: Difficult to palpate her fibroids    Musculoskeletal:      Cervical back: Neck supple.     Skin:     General: Skin is warm and dry.     Neurological:      Mental Status: She is alert.     Psychiatric:         Mood and Affect: Mood normal.